# Patient Record
Sex: FEMALE | Race: WHITE | NOT HISPANIC OR LATINO | URBAN - METROPOLITAN AREA
[De-identification: names, ages, dates, MRNs, and addresses within clinical notes are randomized per-mention and may not be internally consistent; named-entity substitution may affect disease eponyms.]

---

## 2025-05-25 ENCOUNTER — OFFICE VISIT (OUTPATIENT)
Dept: URGENT CARE | Age: 45
End: 2025-05-25
Payer: COMMERCIAL

## 2025-05-25 VITALS — HEART RATE: 88 BPM | TEMPERATURE: 98.6 F | OXYGEN SATURATION: 99 % | RESPIRATION RATE: 16 BRPM

## 2025-05-25 DIAGNOSIS — S61.211A LACERATION OF LEFT INDEX FINGER WITHOUT FOREIGN BODY WITHOUT DAMAGE TO NAIL, INITIAL ENCOUNTER: Primary | ICD-10-CM

## 2025-05-25 PROCEDURE — 99203 OFFICE O/P NEW LOW 30 MIN: CPT

## 2025-05-25 PROCEDURE — 1036F TOBACCO NON-USER: CPT

## 2025-05-25 PROCEDURE — 90715 TDAP VACCINE 7 YRS/> IM: CPT

## 2025-05-25 PROCEDURE — 90471 IMMUNIZATION ADMIN: CPT

## 2025-05-25 NOTE — PROGRESS NOTES
Subjective   Patient ID: Dianna Sandy is a 45 y.o. female. They present today with a chief complaint of Finger Laceration.    History of Present Illness  HPI a 45-year-old female arrives to the clinic with chief complaint of finger laceration.  The patient reports that she was attempting to cut cucumbers today when the knife accidentally made an elliptical cut to the lateral aspect of her left index finger.  She noticed immediate bleeding.  She is here for evaluation.    Past Medical History  Allergies as of 05/25/2025    (Not on File)       Prescriptions Prior to Admission[1]     Medical History[2]    Surgical History[3]     reports that she has never smoked. She has never used smokeless tobacco.    Review of Systems  Review of Systems  Wound, laceration    Objective    Vitals:    05/25/25 1758   Pulse: 88   Resp: 16   Temp: 37 °C (98.6 °F)   SpO2: 99%     No LMP recorded.    Physical Exam  Elliptical like a laceration noted to the lateral aspect of her left index finger.  Procedures    Point of Care Test & Imaging Results from this visit  No results found for this visit on 05/25/25.   Imaging  No results found.    Cardiology, Vascular, and Other Imaging  No other imaging results found for the past 2 days      Diagnostic study results (if any) were reviewed by ED Barney.    Assessment/Plan   Allergies, medications, history, and pertinent labs/EKGs/Imaging reviewed by ED Barney.     Medical Decision Making  Signs and symptoms of an elliptical like laceration noted to her left index finger.  Given the minor injury, sutures are not needed at this time.  The wound was cleansed vigorously with Shur-Clens and sterile saline.  Skin glue was applied to the affected extremity.  Wound care was applied and instructed to the patient today.  Tdap was administered.  She is up-to-date until 2035.  Monitor for worsening signs and symptoms.  The patient agrees to plan of care was discharged  stable condition.    As a result of the work-up, the patient was discharged home.  she was informed of her diagnosis and instructed to come back with any concerns or worsening of condition.  she and was agreeable to the plan as discussed above.  she was given the opportunity to ask questions.  All of the patient's questions were answered.    This document was generated using the assistance of voice recognition software. If there are any errors of spelling, grammar, syntax, or meaning; please feel free to contact me directly for clarification.     Orders and Diagnoses  Diagnoses and all orders for this visit:  Laceration of left index finger without foreign body without damage to nail, initial encounter  Other orders  -     Tdap vaccine, age 7 years and older      Medical Admin Record      Patient disposition: Home    Electronically signed by ED Barney  6:15 PM           [1] (Not in a hospital admission)   [2]   Past Medical History:  Diagnosis Date    Other specified health status     No pertinent past medical history   [3]   Past Surgical History:  Procedure Laterality Date     SECTION, CLASSIC  2018     Section    OTHER SURGICAL HISTORY  2018    Oral Surgery    TONSILLECTOMY  2018    Tonsillectomy

## 2025-07-19 ENCOUNTER — OFFICE VISIT (OUTPATIENT)
Dept: URGENT CARE | Age: 45
End: 2025-07-19
Payer: COMMERCIAL

## 2025-07-19 VITALS
OXYGEN SATURATION: 98 % | TEMPERATURE: 98.7 F | RESPIRATION RATE: 16 BRPM | SYSTOLIC BLOOD PRESSURE: 132 MMHG | HEART RATE: 72 BPM | DIASTOLIC BLOOD PRESSURE: 82 MMHG

## 2025-07-19 DIAGNOSIS — M25.561 ACUTE PAIN OF RIGHT KNEE: Primary | ICD-10-CM

## 2025-07-19 PROCEDURE — 99213 OFFICE O/P EST LOW 20 MIN: CPT | Performed by: NURSE PRACTITIONER

## 2025-07-19 ASSESSMENT — ENCOUNTER SYMPTOMS: ARTHRALGIAS: 1

## 2025-07-19 NOTE — PROGRESS NOTES
"Subjective   Patient ID: Dianna Sandy \"Gabo\" is a 45 y.o. female. They present today with a chief complaint of leg pain bilat behind knee.    History of Present Illness  45-year-old female presents with a new onset of pain and a bump in the right posterior knee for 1 day. she rates the pain as 1 out of 10.  Patient has history of varicose veins but denies DVTs.  Patient denies recent flight, but reports she has been sitting at home more than usual.  Nothing made the symptoms worse. The patient did not try over-the-counter medications.  The patient is really concerned about blood clots because her father passed away due to blood clots.        History provided by:  Patient   used: No        Past Medical History  Allergies as of 07/19/2025    (No Known Allergies)       Prescriptions Prior to Admission[1]     Medical History[2]    Surgical History[3]     reports that she has never smoked. She has never used smokeless tobacco.    Review of Systems  Review of Systems   Musculoskeletal:  Positive for arthralgias.         Objective    Vitals:    07/19/25 1825   BP: 132/82   Pulse: 72   Resp: 16   Temp: 37.1 °C (98.7 °F)   SpO2: 98%     No LMP recorded.    Physical Exam  Vitals and nursing note reviewed.   Constitutional:       Appearance: Normal appearance.   HENT:      Head: Normocephalic and atraumatic.     Cardiovascular:      Rate and Rhythm: Normal rate and regular rhythm.   Pulmonary:      Effort: Pulmonary effort is normal.      Breath sounds: Normal breath sounds.     Musculoskeletal:      Comments: Upon examination, there was a skin colored cyst on the right popliteal fossa.  Mildly tender to touch.  The surrounding skin within normal limits.  No swelling, tenderness or redness.  No pain in the wrist lower extremity.  The right lower extremity neurovascularly intact.        Neurological:      Mental Status: She is alert.         Procedures    Point of Care Test & Imaging Results from this " visit  No results found for this visit on 25.   Imaging  No results found.    Cardiology, Vascular, and Other Imaging  No other imaging results found for the past 2 days      Diagnostic study results (if any) were reviewed by ED Davis.    Assessment/Plan   Allergies, medications, history, and pertinent labs/EKGs/Imaging reviewed by ED Davis.     Medical Decision Making  Collaborated with Dr. Duval regarding sending the patient to the emergency room for potential ultrasound of the right lower extremity as her differential diagnoses include DVT.  The patient verbalized understanding and states that she would go to [facility] emergency room in [place]  Verbal report  given to BEATA Cruz in the ER at [facility].     Orders and Diagnoses  Diagnoses and all orders for this visit:  Acute pain of right knee      Medical Admin Record      Patient disposition: ED    Electronically signed by ED Davis  6:56 PM           [1] (Not in a hospital admission)   [2]   Past Medical History:  Diagnosis Date    Other specified health status     No pertinent past medical history   [3]   Past Surgical History:  Procedure Laterality Date     SECTION, CLASSIC  2018     Section    OTHER SURGICAL HISTORY  2018    Oral Surgery    TONSILLECTOMY  2018    Tonsillectomy